# Patient Record
Sex: FEMALE | Race: WHITE
[De-identification: names, ages, dates, MRNs, and addresses within clinical notes are randomized per-mention and may not be internally consistent; named-entity substitution may affect disease eponyms.]

---

## 2019-06-18 ENCOUNTER — HOSPITAL ENCOUNTER (EMERGENCY)
Dept: HOSPITAL 89 - ER | Age: 44
Discharge: PSYCHIATRIC OR SUBSTANCE USE FACILITY | End: 2019-06-18
Payer: MEDICARE

## 2019-06-18 ENCOUNTER — HOSPITAL ENCOUNTER (INPATIENT)
Dept: HOSPITAL 89 - BHS | Age: 44
LOS: 2 days | Discharge: HOME | DRG: 882 | End: 2019-06-20
Attending: PSYCHIATRY & NEUROLOGY | Admitting: PSYCHIATRY & NEUROLOGY
Payer: MEDICARE

## 2019-06-18 VITALS — SYSTOLIC BLOOD PRESSURE: 155 MMHG | DIASTOLIC BLOOD PRESSURE: 70 MMHG

## 2019-06-18 VITALS — BODY MASS INDEX: 36.37 KG/M2 | HEIGHT: 68 IN | WEIGHT: 240 LBS

## 2019-06-18 DIAGNOSIS — Z79.899: ICD-10-CM

## 2019-06-18 DIAGNOSIS — F41.9: ICD-10-CM

## 2019-06-18 DIAGNOSIS — R45.851: Primary | ICD-10-CM

## 2019-06-18 DIAGNOSIS — F31.81: ICD-10-CM

## 2019-06-18 DIAGNOSIS — G40.909: ICD-10-CM

## 2019-06-18 DIAGNOSIS — F43.23: Primary | ICD-10-CM

## 2019-06-18 DIAGNOSIS — M19.90: ICD-10-CM

## 2019-06-18 DIAGNOSIS — F60.3: ICD-10-CM

## 2019-06-18 DIAGNOSIS — F43.10: ICD-10-CM

## 2019-06-18 DIAGNOSIS — M79.7: ICD-10-CM

## 2019-06-18 LAB — PLATELET COUNT, AUTOMATED: 207 K/UL (ref 150–450)

## 2019-06-18 PROCEDURE — 84520 ASSAY OF UREA NITROGEN: CPT

## 2019-06-18 PROCEDURE — 80320 DRUG SCREEN QUANTALCOHOLS: CPT

## 2019-06-18 PROCEDURE — 82565 ASSAY OF CREATININE: CPT

## 2019-06-18 PROCEDURE — 82435 ASSAY OF BLOOD CHLORIDE: CPT

## 2019-06-18 PROCEDURE — 84132 ASSAY OF SERUM POTASSIUM: CPT

## 2019-06-18 PROCEDURE — 36415 COLL VENOUS BLD VENIPUNCTURE: CPT

## 2019-06-18 PROCEDURE — 84450 TRANSFERASE (AST) (SGOT): CPT

## 2019-06-18 PROCEDURE — 99284 EMERGENCY DEPT VISIT MOD MDM: CPT

## 2019-06-18 PROCEDURE — 80305 DRUG TEST PRSMV DIR OPT OBS: CPT

## 2019-06-18 PROCEDURE — 80329 ANALGESICS NON-OPIOID 1 OR 2: CPT

## 2019-06-18 PROCEDURE — 81025 URINE PREGNANCY TEST: CPT

## 2019-06-18 PROCEDURE — 84295 ASSAY OF SERUM SODIUM: CPT

## 2019-06-18 PROCEDURE — 82040 ASSAY OF SERUM ALBUMIN: CPT

## 2019-06-18 PROCEDURE — 80178 ASSAY OF LITHIUM: CPT

## 2019-06-18 PROCEDURE — 84443 ASSAY THYROID STIM HORMONE: CPT

## 2019-06-18 PROCEDURE — 84155 ASSAY OF PROTEIN SERUM: CPT

## 2019-06-18 PROCEDURE — 84075 ASSAY ALKALINE PHOSPHATASE: CPT

## 2019-06-18 PROCEDURE — 84460 ALANINE AMINO (ALT) (SGPT): CPT

## 2019-06-18 PROCEDURE — 82947 ASSAY GLUCOSE BLOOD QUANT: CPT

## 2019-06-18 PROCEDURE — 82374 ASSAY BLOOD CARBON DIOXIDE: CPT

## 2019-06-18 PROCEDURE — 83735 ASSAY OF MAGNESIUM: CPT

## 2019-06-18 PROCEDURE — 82247 BILIRUBIN TOTAL: CPT

## 2019-06-18 PROCEDURE — 82310 ASSAY OF CALCIUM: CPT

## 2019-06-18 PROCEDURE — 85025 COMPLETE CBC W/AUTO DIFF WBC: CPT

## 2019-06-18 PROCEDURE — 81001 URINALYSIS AUTO W/SCOPE: CPT
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
## 2019-06-19 VITALS — SYSTOLIC BLOOD PRESSURE: 137 MMHG | DIASTOLIC BLOOD PRESSURE: 87 MMHG

## 2019-06-19 RX ADMIN — GABAPENTIN SCH MG: 300 CAPSULE ORAL at 14:01

## 2019-06-19 RX ADMIN — ACETAMINOPHEN PRN MG: 325 TABLET ORAL at 21:36

## 2019-06-19 RX ADMIN — TOPIRAMATE SCH MG: 25 TABLET, FILM COATED ORAL at 12:10

## 2019-06-19 RX ADMIN — LITHIUM CARBONATE SCH MG: 300 CAPSULE, GELATIN COATED ORAL at 21:36

## 2019-06-19 RX ADMIN — LITHIUM CARBONATE SCH MG: 300 CAPSULE, GELATIN COATED ORAL at 14:01

## 2019-06-19 RX ADMIN — SERTRALINE HYDROCHLORIDE SCH MG: 50 TABLET, FILM COATED ORAL at 12:09

## 2019-06-19 RX ADMIN — GABAPENTIN SCH MG: 300 CAPSULE ORAL at 21:36
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
## 2019-06-20 VITALS — DIASTOLIC BLOOD PRESSURE: 95 MMHG | SYSTOLIC BLOOD PRESSURE: 119 MMHG

## 2019-06-20 RX ADMIN — GABAPENTIN SCH MG: 300 CAPSULE ORAL at 08:16

## 2019-06-20 RX ADMIN — SERTRALINE HYDROCHLORIDE SCH MG: 50 TABLET, FILM COATED ORAL at 08:17

## 2019-06-20 RX ADMIN — GABAPENTIN SCH MG: 300 CAPSULE ORAL at 14:17

## 2019-06-20 RX ADMIN — TOPIRAMATE SCH MG: 25 TABLET, FILM COATED ORAL at 08:17

## 2019-06-20 RX ADMIN — ACETAMINOPHEN PRN MG: 325 TABLET ORAL at 12:10

## 2019-06-20 RX ADMIN — LITHIUM CARBONATE SCH MG: 300 CAPSULE, GELATIN COATED ORAL at 14:17

## 2019-06-20 RX ADMIN — LITHIUM CARBONATE SCH MG: 300 CAPSULE, GELATIN COATED ORAL at 08:16

## 2019-06-21 NOTE — [facility] DISCHARGE
DATE OF ADMISSION:  June 18, 2019

DATE OF DISCHARGE:  June 20, 2019



ATTENDING PHYSICIAN

Tulio Pinedo MD



Patient was seen approximately 1000 hours on the a.m. of 20 June 2019 for note 

concerning this dictation.



FINAL DIAGNOSES

1.  Borderline personality disorder.  

2.  Bipolar II disorder, recent depressed.  

3.  History of posttraumatic stress disorder.

4.  Limited stress-coping mechanism.

5.  Supportive relationship overall with her mother.  



REASON FOR ADMISSION

This is a 43-year-old  female who moved to town with her mother in the 

last few weeks here to [place], [place], to set up permanent residence.  Please 

see H and P for full details.  Patient being overwhelmed with the move in 

general, financial stressors, and getting things in order as far medication 

management in [facility].  Patient is noted prior to admission to have been setting up

treatment at [facility], but was unable to get in to 

see a provider.  Brief suicidal ideation ensued, patient not having any 

intention.  She has had a long history of hospitalizations associated with what 

is presumably mostly due to borderline personality disorder.  Patient will be in

need of therapists as well.  Medications were largely continued on the unit with

the exception of Zyprexa, which was lowered somewhat at the patient's request.  

Topamax was restarted at a low dose at 50 mg q.a.m.  This could offset some of 

the weight gain from Zyprexa and also lead to a more effective mood stability 

agent in the long term for this patient with borderline personality disorder.  

Patient overall making no parasuicidal gestures or behaviors on the unit, 

interacting well throughout her stay.  Patient having some brief suicidal 

thoughts, even at time of discharge.  However, patient had contracted for safety

and will return to the ER or call crisis line and will set up effective 

outpatient management to limit hospital frequencies of admission.  



PHYSICAL EXAMINATION

Please see emergency room note.  Notable for heavyset 43-year-old female.  

Appears stated age.  Vital signs at the time of admission:  Temperature 99.1, 

pulse 73, respiratory rate 18, blood pressure 149/76, pulse oximetry 95% on room

air.  At the time of discharge from [facility], vital signs showed 

temperature 98.6, pulse 76, respiratory rate 15, blood pressure 119/95, pulse 

oximetry 95% on room air.  



LABORATORY DATA

Upon admission, CBC was unremarkable.  CMP unremarkable as well.  TSH did show 

mild elevation of 5.38.  This could be related to lithium use; however, will 

require further investigation outpatient status.  Urinalysis unremarkable.  

Pregnancy screen negative.  Toxicology screen negative.  Notably, lithium level 

at 0.9.  



MENTAL STATUS EXAMINATION AT TIME OF DISCHARGE

GENERAL APPEARANCE, BEHAVIOR, AND ATTITUDE:  This is a polite, cooperative, 

43-year-old female.  Some psychomotor retardation present.  Patient making good 

eye contact.  No periods of tearfulness.

SPEECH:  Largely within normal limits.  Regular rate, rhythm, volume, and tone.

MOOD:  Described as improved. 

AFFECT:  Full briefly at times and mood congruent.  

THOUGHT PROCESSES:  Goal directed, logical, patient deciding she should exit the

hospital.  No loose associations or flight of ideas.  

THOUGHT CONTENT:  Free of auditory or visual hallucinations, ideas of reference,

thought broadcasting, delusions, obsessions, compulsions, and patient denying 

any suicidal intent and adamantly denying homicidal ideation.

SENSORIUM:  Clear.

COGNITION:  Alert and oriented to person, place, time, and situation.  

MEMORY:  Immediate, recent, and remote estimated intact.

INTELLIGENCE:  Average based on interview.  

INSIGHT AND JUDGMENT:  Limited stress coping mechanisms are present; however, 

patient has the support of her mother in the outpatient community after recent 

move to [place].



RESULTS OF TESTING

Imaging:  None.  Laboratory data:  See above. 



CONSULTATIONS

None.



TREATMENT

Patient received medications, participated in individual and group therapy.



HOSPITAL COURSE

Patient was polite and cooperative throughout her stay, indicated a desire to 

get back into DBT therapy as well as set up proper medication management and 

therapy management in general in the [facility].  Patient showing no 

parasuicidal behaviors while on the unit.  Medications were continued.  Zyprexa 

dose was lowered due to history of weight gain and lethargy.  



CONDITION OF PATIENT ON DISCHARGE

Stable.  Considered a minimal risk to herself or others, appropriate for 

outpatient care.  



DISPOSITION

Patient discharged to home in the care of her mother.  She would follow up with 

[facility] for DBT therapy as well as medication 

management.  It appears that the patient's primary diagnosis would be borderline

personality disorder, and she remains on a considerable amount of varying 

psychotropic medications.  Close observation on an outpatient basis.  It would 

be recommended that patient see if she can decrease some of these medications 

overall.  



DISCHARGE MEDICATIONS 

1.  Buspirone 15 mg three times a day.

2.  Gabapentin 100 mg three times a day.

3.  Lithium carbonate 300 mg three times a day.

4.  Zyprexa 10 mg at bedtime. 

5.  Xarelto 20 mg daily. 

6.  Zoloft 200 mg q.a.m. 

7.  Topamax 50 mg q.a.m.

8.  Trazodone 200 mg at bedtime.  



PLAN

Crisis line was given should symptoms return.  Risks, benefits, and alternatives

of the above discharge plan were discussed.  Informed consent was given to 

proceed with the above discharge plan by this competent patient and patient's 

mother present at time of discharge.

KAYLENE

## 2019-07-10 ENCOUNTER — HOSPITAL ENCOUNTER (OUTPATIENT)
Dept: HOSPITAL 89 - RAD | Age: 44
End: 2019-07-10
Attending: NURSE PRACTITIONER
Payer: MEDICARE

## 2019-07-10 DIAGNOSIS — D50.9: ICD-10-CM

## 2019-07-10 DIAGNOSIS — E55.9: ICD-10-CM

## 2019-07-10 DIAGNOSIS — R53.81: ICD-10-CM

## 2019-07-10 DIAGNOSIS — E87.8: ICD-10-CM

## 2019-07-10 DIAGNOSIS — S39.92XS: Primary | ICD-10-CM

## 2019-07-10 DIAGNOSIS — R53.83: ICD-10-CM

## 2019-07-10 DIAGNOSIS — E53.8: ICD-10-CM

## 2019-07-10 DIAGNOSIS — E78.00: ICD-10-CM

## 2019-07-10 DIAGNOSIS — R73.01: ICD-10-CM

## 2019-07-10 DIAGNOSIS — K52.9: ICD-10-CM

## 2019-07-10 LAB
LDLC SERPL-MCNC: 150 MG/DL
PLATELET COUNT, AUTOMATED: 259 K/UL (ref 150–450)

## 2019-07-10 PROCEDURE — 36415 COLL VENOUS BLD VENIPUNCTURE: CPT

## 2019-07-10 PROCEDURE — 84460 ALANINE AMINO (ALT) (SGPT): CPT

## 2019-07-10 PROCEDURE — 84478 ASSAY OF TRIGLYCERIDES: CPT

## 2019-07-10 PROCEDURE — 82947 ASSAY GLUCOSE BLOOD QUANT: CPT

## 2019-07-10 PROCEDURE — 83718 ASSAY OF LIPOPROTEIN: CPT

## 2019-07-10 PROCEDURE — 82306 VITAMIN D 25 HYDROXY: CPT

## 2019-07-10 PROCEDURE — 85025 COMPLETE CBC W/AUTO DIFF WBC: CPT

## 2019-07-10 PROCEDURE — 82435 ASSAY OF BLOOD CHLORIDE: CPT

## 2019-07-10 PROCEDURE — 82310 ASSAY OF CALCIUM: CPT

## 2019-07-10 PROCEDURE — 83036 HEMOGLOBIN GLYCOSYLATED A1C: CPT

## 2019-07-10 PROCEDURE — 84450 TRANSFERASE (AST) (SGOT): CPT

## 2019-07-10 PROCEDURE — 84132 ASSAY OF SERUM POTASSIUM: CPT

## 2019-07-10 PROCEDURE — 82247 BILIRUBIN TOTAL: CPT

## 2019-07-10 PROCEDURE — 84295 ASSAY OF SERUM SODIUM: CPT

## 2019-07-10 PROCEDURE — 84075 ASSAY ALKALINE PHOSPHATASE: CPT

## 2019-07-10 PROCEDURE — 82465 ASSAY BLD/SERUM CHOLESTEROL: CPT

## 2019-07-10 PROCEDURE — 82565 ASSAY OF CREATININE: CPT

## 2019-07-10 PROCEDURE — 84155 ASSAY OF PROTEIN SERUM: CPT

## 2019-07-10 PROCEDURE — 82728 ASSAY OF FERRITIN: CPT

## 2019-07-10 PROCEDURE — 84443 ASSAY THYROID STIM HORMONE: CPT

## 2019-07-10 PROCEDURE — 84520 ASSAY OF UREA NITROGEN: CPT

## 2019-07-10 PROCEDURE — 82607 VITAMIN B-12: CPT

## 2019-07-10 PROCEDURE — 72220 X-RAY EXAM SACRUM TAILBONE: CPT

## 2019-07-10 PROCEDURE — 82040 ASSAY OF SERUM ALBUMIN: CPT

## 2019-07-10 PROCEDURE — 82374 ASSAY BLOOD CARBON DIOXIDE: CPT

## 2019-07-10 NOTE — RADIOLOGY IMAGING REPORT
FACILITY: [facility] 

 

PATIENT NAME: Tana Neal

: 1975

MR: [id number]

V: [id number]

EXAM DATE: 

ORDERING PHYSICIAN: SHIELA LAMAS

TECHNOLOGIST: 

 

Location: [facility]

Patient: Tana Neal

: 1975

MRN: [id number]

Visit/Account:[id number]

Date of Sevice:  7/10/2019

 

ACCESSION #: [id number]

 

Exam: SACRUM & COCCYX

 

Indication: Fall in 2018 with progressive pain,

 

Comparison: None available

 

Findings: No fracture, dislocation or acute osseous abnormality of the sacrum and coccyx is identifie
d.  No areas of bony sclerosis or abnormal angulation are seen.  Sacroiliac joints are within normal 
limits.

 

 

IMPRESSION:

 

1.  Negative exam of the sacrum and coccyx

 

Report Dictated By: Timmy Jimenez at 7/10/2019 2:37 PM

 

Report E-Signed By: Timmy Jimenez  at 7/10/2019 2:38 PM

 

WSN:[id number]

## 2019-07-25 ENCOUNTER — HOSPITAL ENCOUNTER (EMERGENCY)
Dept: HOSPITAL 89 - ER | Age: 44
Discharge: HOME | End: 2019-07-25
Payer: MEDICARE

## 2019-07-25 VITALS — DIASTOLIC BLOOD PRESSURE: 69 MMHG | SYSTOLIC BLOOD PRESSURE: 119 MMHG

## 2019-07-25 DIAGNOSIS — R10.30: Primary | ICD-10-CM

## 2019-07-25 DIAGNOSIS — S90.121A: ICD-10-CM

## 2019-07-25 PROCEDURE — 99283 EMERGENCY DEPT VISIT LOW MDM: CPT

## 2019-07-25 PROCEDURE — 81001 URINALYSIS AUTO W/SCOPE: CPT

## 2019-07-25 NOTE — ER REPORT
History and Physical


Time Seen By MD:  22:43


HPI/ROS


CHIEF COMPLAINT: Bladder pain, right 4th toe injury





HISTORY OF PRESENT ILLNESS: 43-year-old female staying in the hospital with her 


mother who is at inpatient.  She was sleeping in a chair that reclines when she 


went to get up.  She jammed her right 4th toe and a cracked invented in an 


obscure direction causing it to be purple bruise.  She is on Xarelto.  Patient 


also notes bladder pain.  She describes a foul smell to her orders.  She denies 


burning with urination, or hematuria.  Patient notes a low-grade fever a couple 


days ago.  Patient is status post partial hysterectomy.





REVIEW OF SYSTEMS:


Respiratory: No cough, no dyspnea.


Cardiovascular: No chest pain, no palpitations.


Gastrointestinal: As above


Musculoskeletal: No back pain.


Allergies:  


Coded Allergies:  


     Tetracyclines (Verified  Allergy, Intermediate, "RASH", 7/25/19)


     gatifloxacin (Verified  Allergy, Intermediate, SEIZURE, 7/25/19)


     hydromorphone (Verified  Adverse Reaction, Intermediate, "CHEST PAINS", 


7/25/19)


     lamotrigine (Verified  Adverse Reaction, Intermediate, "SPOTS ON LEGS", 


7/25/19)


Home Meds


Reported Medications


Lurasidone (LATUDA) 80 Mg Tab, 80 MG PO QDAY, TAB


   7/25/19


Sertraline Hcl (SERTRALINE HCL) 100 Mg Tablet, 200 TAB PO QDAY, TAB


   6/20/19


Topiramate (TOPIRAMATE) 50 Mg Tablet, 50 MG PO QAM


   6/20/19


Multivits,Th W-Fe,Other Min (THERA-M) 1 Each Tablet, 1 EACH PO QDAY


   6/20/19


Lithium Carbonate (LITHIUM CARBONATE) 300 Mg Tablet, 300 MG PO TID


   6/18/19


Trazodone Hcl (TRAZODONE HCL) 100 Mg Tablet, 200 MG PO QHS PRN for INSOMNIA, TAB


   TAKE 200 MG ABOUT AN HOUR BEFORE YOU PLAN TO GO TO SLEEP AS NEEDED


   FOR INSOMNIA.


   6/18/19


Rivaroxaban 20 Mg (XARELTO 20 MG) 20 Mg Tablet, 20 MG PO QDAY, TAB


   6/18/19


Discontinued Reported Medications


Olanzapine (OLANZAPINE) 20 Mg Tablet, 10 MG PO QHS


   6/20/19


Gabapentin (GABAPENTIN) 300 Mg Capsule, 100 MG PO TID, CAPSULE


   6/18/19


Buspirone Hcl (BUSPIRONE HCL) 15 Mg Tablet, 15 MG PO TID, #10 TAB


   6/18/19


Hx Smoking:  Yes


Smoking Status:  Former Smoker


Exposure to Second Hand Smoke?:  No


Hx Substance Use Disorder:  No


Hx Alcohol Use:  Yes


Constitutional





Vital Sign - Last 24 Hours








 7/25/19 7/25/19 7/25/19 7/25/19





 22:47 22:59 23:00 23:14


 


Temp 97.6   


 


Pulse 62 61  61


 


Resp 14   


 


B/P (MAP) 141/67  128/65 (86) 


 


Pulse Ox 96 94  94


 


O2 Delivery Room Air   


 


    





 7/25/19 7/25/19  





 23:15 23:29  


 


Pulse  61  


 


B/P (MAP) 119/69 (86)   


 


Pulse Ox  94  








Physical Exam


  General Appearance: The patient is alert, has no immediate need for airway 


protection and no current signs of toxicity.  Vital signs stable, afebrile, 


pulse ox normal


HEENT: Pupils equal and round no injection.  Oropharynx without redness or 


exudate


Respiratory: Chest is non tender, lungs are clear to auscultation.


Cardiac: regular rate and rhythm


Gastrointestinal: Abdomen is soft and non tender, no masses, bowel sounds 


normal.  Mild suprapubic tenderness


Musculoskeletal:  Neck: Neck is supple and non tender.


Extremities have full range of motion and are non tender.


Skin: No rashes or lesions.





DIFFERENTIAL DIAGNOSIS: After history and physical exam differential diagnosis 


was considered for abdominal pain including but not limited to appendicitis, 


cholecystitis, gastritis and urinary tract infection.  Toe injury differential 


Sprain, strain, fracture, dislocation, contusion





Medical Decision Making


Data Points


Laboratory





Urinalysis








Test


 7/25/19


22:50


 


Urine Color Yellow 


 


Urine Clarity Clear 


 


Urine pH


 5.0 pH


(4.8-9.5)


 


Urine Specific Gravity 1.014 


 


Urine Protein


 Negative mg/dL


(NEGATIVE)


 


Urine Glucose (UA)


 Negative mg/dL


(NEGATIVE)


 


Urine Ketones


 Negative mg/dL


(NEGATIVE)


 


Urine Blood


 Negative


(NEGATIVE)


 


Urine Nitrite


 Negative


(NEGATIVE)


 


Urine Bilirubin


 Negative


(NEGATIVE)


 


Urine Urobilinogen


 Negative mg/dL


(0.2-1.9)


 


Urine Leukocyte Esterase


 Negative


(NEGATIVE)


 


Urine RBC


 6 /HPF


(0-2/HPF)


 


Urine WBC


 2 /HPF


(0-5/HPF)


 


Urine Squamous Epithelial


Cells Many /LPF


(</=FEW)


 


Urine Bacteria


 Few /HPF


(NONE-FEW)


 


Urine Mucus


 Few /HPF


(NONE-FEW)











EKG/Imaging


Imaging


X-ray: Right foot, 3 views was obtained.  I viewed the images myself on the PACS


system.  My interpretation of the images is: No fracture no dislocation or 


malalignment.  The radiologist interpretation had no clinically significant 


variation from this interpretation.





ED Course/Re-evaluation


ED Course


Patient was admitted to an examination room.  H&P was done.  The differential 


diagnoses was considered.  On clinical examination.  Patient has a bruised right


4th toe.  It's neurovascularly intact.  Diagnostic x-rays are performed which 


were unremarkable for obvious fracture.  Patient advised treatment for 


contusion.  She's on Xarelto is unable to take NSAIDs.  She is advised Tylenol 


for pain.  Patient was complaining of bladder pain and foul-smelling urine.  A 


urinalysis is unremarkable.  There is a trace hematuria, likely from her being 


on Xarelto.  No evidence of infection.  No leukocyte esterase.  Patient advised 


to follow-up with primary care.  She states she has an appointment tomorrow.


Decision to Disposition Date:  Jul 25, 2019


Decision to Disposition Time:  23:19





Depart


Departure


Latest Vital Signs





Vital Signs








  Date Time  Temp Pulse Resp B/P (MAP) Pulse Ox O2 Delivery O2 Flow Rate FiO2


 


7/25/19 23:29  61   94   


 


7/25/19 23:15    119/69 (86)    


 


7/25/19 22:47 97.6  14   Room Air  








Impression:  


   Primary Impression:  


   Lower abdominal pain


   Additional Impression:  


   Contusion of toe, right


Condition:  Improved


Disposition:  HOME OR SELF-CARE


Patient Instructions:  Abdominal Pain (ED), Contusion in Adults (ED)





Additional Instructions:  


Use Tylenol for pain


Follow-up with your primary care doctor as planned tomorrow.





Problem Qualifiers








   Additional Impression:  


   Contusion of toe, right


   Encounter type:  initial encounter  Toe:  lesser toe  Damage to nail status: 


   without damage  Qualified Codes:  S90.121A - Contusion of right lesser toe(s)


   without damage to nail, initial encounter








KAN JAEGER DO              Jul 25, 2019 22:46

## 2019-07-25 NOTE — RADIOLOGY IMAGING REPORT
FACILITY: [facility] [place] 

 

PATIENT NAME: Tana Neal

: 1975

MR: [id number]

V: [id number]

EXAM DATE: 

ORDERING PHYSICIAN: KAN JAEGER

TECHNOLOGIST: 

 

Location: [facility]

Patient: Tana Neal

: 1975

MRN: [id number]

Visit/Account:[id number]

Date of Sevice:  2019

 

ACCESSION #: [id number]

 

INDICATION: Right 4th toe  bruising injury.

 

EXAM DATE:  2019 10:56 PM

 

COMPARISON: None.

 

FINDINGS:

3 views right foot. Mineralization is normal. No acute alignment abnormality or fracture.  Small os p
eroneum.  Small calcaneal spur.  Soft tissues are unremarkable.

 

IMPRESSION:  No acute osseous abnormality of the right foot.

 

Report Dictated By: Isael Washington MD at 2019 11:38 PM

 

Report E-Signed By: Isael Washington MD  at 2019 11:39 PM

 

WSN:[id number]

## 2019-07-26 ENCOUNTER — HOSPITAL ENCOUNTER (OUTPATIENT)
Dept: HOSPITAL 89 - CT | Age: 44
End: 2019-07-26
Attending: NURSE PRACTITIONER
Payer: MEDICARE

## 2019-07-26 DIAGNOSIS — R10.814: Primary | ICD-10-CM

## 2019-07-26 PROCEDURE — 74177 CT ABD & PELVIS W/CONTRAST: CPT

## 2019-07-26 NOTE — RADIOLOGY IMAGING REPORT
FACILITY: [facility] [place] 

 

PATIENT NAME: Tana Neal

: 1975

MR: [id number]

V: [id number]

EXAM DATE: 

ORDERING PHYSICIAN: SHIELA LAMAS

TECHNOLOGIST: 

 

Location: [facility]

Patient: Tana Neal

: 1975

MRN: [id number]

Visit/Account:[id number]

Date of Sevice:  2019

 

ACCESSION #: [id number]

 

EXAMINATION: CT abdomen and pelvis with contrast

 

COMPARISON: None.

 

HISTORY: Right lower quadrant pain and diarrhea for one year.

 

PROCEDURE: Multiplanar contrast enhanced CT of the abdomen and pelvis with 75 mL intravenous Isovue 3
70. One of the following dose optimization techniques was utilized in the performance of this exam: A
utomated exposure control; adjustment of the mA and/or kV according to the patient's size; or use of 
an iterative  reconstruction technique.  Specific details can be referenced in the facility's radiolo
gy CT exam operational policy.

 

FINDINGS:

Visualized thorax: No acute findings.

 

Liver: Negative.

 

Gallbladder and biliary system: Negative

 

Spleen: Negative.

 

Pancreas: Negative.

 

Adrenal glands: Negative.

 

Kidneys and bladder: Negative.

 

Vessels: Negative.

 

Bowel and mesentery: Stomach, small bowel, and appendix are unremarkable. Small amount of stool in co
sherin. No definite colonic diverticula are identified. No inflammation.

 

Pelvic organs: Hysterectomy. No adnexal mass.

 

Lymph nodes: No adenopathy.

 

Free air/free fluid: None.

 

Musculoskeletal: Negative.

 

IMPRESSION:

No findings of active disease in the abdomen or pelvis.

 

Report Dictated By: Dedrick Kim MD at 2019 6:48 PM

 

Report E-Signed By: Dedrick Kim MD  at 2019 6:56 PM

 

WSN:[id number]

## 2019-08-16 ENCOUNTER — HOSPITAL ENCOUNTER (INPATIENT)
Dept: HOSPITAL 89 - BHS | Age: 44
LOS: 3 days | Discharge: HOME | DRG: 885 | End: 2019-08-19
Attending: PSYCHIATRY & NEUROLOGY | Admitting: PSYCHIATRY & NEUROLOGY
Payer: MEDICARE

## 2019-08-16 ENCOUNTER — HOSPITAL ENCOUNTER (EMERGENCY)
Dept: HOSPITAL 89 - ER | Age: 44
End: 2019-08-16
Payer: MEDICARE

## 2019-08-16 VITALS — DIASTOLIC BLOOD PRESSURE: 80 MMHG | SYSTOLIC BLOOD PRESSURE: 129 MMHG

## 2019-08-16 VITALS — DIASTOLIC BLOOD PRESSURE: 82 MMHG | SYSTOLIC BLOOD PRESSURE: 134 MMHG

## 2019-08-16 VITALS — BODY MASS INDEX: 39.84 KG/M2 | HEIGHT: 69 IN | WEIGHT: 269 LBS

## 2019-08-16 DIAGNOSIS — F43.12: ICD-10-CM

## 2019-08-16 DIAGNOSIS — J45.909: ICD-10-CM

## 2019-08-16 DIAGNOSIS — Z86.718: ICD-10-CM

## 2019-08-16 DIAGNOSIS — G40.909: ICD-10-CM

## 2019-08-16 DIAGNOSIS — F60.3: ICD-10-CM

## 2019-08-16 DIAGNOSIS — M79.7: ICD-10-CM

## 2019-08-16 DIAGNOSIS — J32.9: ICD-10-CM

## 2019-08-16 DIAGNOSIS — Z88.8: ICD-10-CM

## 2019-08-16 DIAGNOSIS — Z86.711: ICD-10-CM

## 2019-08-16 DIAGNOSIS — R00.1: ICD-10-CM

## 2019-08-16 DIAGNOSIS — Z91.410: ICD-10-CM

## 2019-08-16 DIAGNOSIS — R45.851: Primary | ICD-10-CM

## 2019-08-16 DIAGNOSIS — F31.81: Primary | ICD-10-CM

## 2019-08-16 DIAGNOSIS — R45.851: ICD-10-CM

## 2019-08-16 LAB — PLATELET COUNT, AUTOMATED: 218 K/UL (ref 150–450)

## 2019-08-16 PROCEDURE — 82565 ASSAY OF CREATININE: CPT

## 2019-08-16 PROCEDURE — 83550 IRON BINDING TEST: CPT

## 2019-08-16 PROCEDURE — 82310 ASSAY OF CALCIUM: CPT

## 2019-08-16 PROCEDURE — 93005 ELECTROCARDIOGRAM TRACING: CPT

## 2019-08-16 PROCEDURE — 81001 URINALYSIS AUTO W/SCOPE: CPT

## 2019-08-16 PROCEDURE — 84443 ASSAY THYROID STIM HORMONE: CPT

## 2019-08-16 PROCEDURE — 80329 ANALGESICS NON-OPIOID 1 OR 2: CPT

## 2019-08-16 PROCEDURE — 80305 DRUG TEST PRSMV DIR OPT OBS: CPT

## 2019-08-16 PROCEDURE — 82040 ASSAY OF SERUM ALBUMIN: CPT

## 2019-08-16 PROCEDURE — 82247 BILIRUBIN TOTAL: CPT

## 2019-08-16 PROCEDURE — 85025 COMPLETE CBC W/AUTO DIFF WBC: CPT

## 2019-08-16 PROCEDURE — 80178 ASSAY OF LITHIUM: CPT

## 2019-08-16 PROCEDURE — 84075 ASSAY ALKALINE PHOSPHATASE: CPT

## 2019-08-16 PROCEDURE — 82435 ASSAY OF BLOOD CHLORIDE: CPT

## 2019-08-16 PROCEDURE — 99284 EMERGENCY DEPT VISIT MOD MDM: CPT

## 2019-08-16 PROCEDURE — 84295 ASSAY OF SERUM SODIUM: CPT

## 2019-08-16 PROCEDURE — 84520 ASSAY OF UREA NITROGEN: CPT

## 2019-08-16 PROCEDURE — 83735 ASSAY OF MAGNESIUM: CPT

## 2019-08-16 PROCEDURE — 83540 ASSAY OF IRON: CPT

## 2019-08-16 PROCEDURE — 84460 ALANINE AMINO (ALT) (SGPT): CPT

## 2019-08-16 PROCEDURE — 84450 TRANSFERASE (AST) (SGOT): CPT

## 2019-08-16 PROCEDURE — 84155 ASSAY OF PROTEIN SERUM: CPT

## 2019-08-16 PROCEDURE — 82947 ASSAY GLUCOSE BLOOD QUANT: CPT

## 2019-08-16 PROCEDURE — 81025 URINE PREGNANCY TEST: CPT

## 2019-08-16 PROCEDURE — 84132 ASSAY OF SERUM POTASSIUM: CPT

## 2019-08-16 PROCEDURE — 82374 ASSAY BLOOD CARBON DIOXIDE: CPT

## 2019-08-16 PROCEDURE — 80320 DRUG SCREEN QUANTALCOHOLS: CPT

## 2019-08-16 PROCEDURE — 36415 COLL VENOUS BLD VENIPUNCTURE: CPT

## 2019-08-16 RX ADMIN — LITHIUM CARBONATE SCH MG: 450 TABLET, EXTENDED RELEASE ORAL at 20:28

## 2019-08-16 RX ADMIN — LURASIDONE HYDROCHLORIDE SCH MG: 40 TABLET, FILM COATED ORAL at 20:27

## 2019-08-16 RX ADMIN — TOPIRAMATE SCH MG: 25 TABLET, FILM COATED ORAL at 20:27

## 2019-08-16 NOTE — EKG
FACILITY: [facility] [place] 

 

PATIENT NAME: RAJAT HERNANDEZ

: [id number]

MR: [id number]

V: [id number]

EXAM DATE: 

ORDERING PHYSICIAN: HÉCTOR HERNANDEZ

TECHNOLOGIST: JAQUELIN

 

Test Reason : BHS

Blood Pressure : ***/*** mmHG

Vent. Rate : 048 BPM     Atrial Rate : 048 BPM

   P-R Int : 168 ms          QRS Dur : 118 ms

    QT Int : 434 ms       P-R-T Axes : 046 -13 051 degrees

   QTc Int : 387 ms

 

Marked sinus bradycardia

Low voltage QRS

Abnormal ECG

No previous ECGs available

Confirmed by Kaiden Dumont ([id number]) on 2019 9:03:54 PM

 

Referred By:  DAVID           Confirmed By:Kaiden Marte

## 2019-08-16 NOTE — ER REPORT
History and Physical


Time Seen By MD:  13:55


HPI/ROS


CHIEF COMPLAINT: Depression, suicidal ideation





HISTORY OF PRESENT ILLNESS: Patient is a 43-year-old female here with complaints


of depression, suicidal ideation for the past week with family stressors. 


Patient reports that family members often make fun of her and try to isolate 


her. Patient does report being on multiple antidepressants and mood modify 


medications including recent addition of Effexor approximately one week ago, 


lithium, Topamax, latuda, Xarelto, trazodone. Patient was evaluated today by a 


therapist who recommended coming into the hospital for further evaluation and 


care. Patient does report a history of low iron, IBS.





REVIEW OF SYSTEMS:


Constitutional: No fever, no chills.


Eyes: No discharge.


ENT: No sore throat. 


Cardiovascular: No chest pain, no palpitations.


Respiratory: No cough, no shortness of breath.


Gastrointestinal: No abdominal pain, no vomiting.


Genitourinary: No hematuria.


Musculoskeletal: No back pain.


Skin: No rashes.


Neurological: No headache.


Psych: + Depression, suicidal ideations


Allergies:  


Coded Allergies:  


     Tetracyclines (Verified  Allergy, Intermediate, "RASH", 8/16/19)


     gatifloxacin (Verified  Allergy, Intermediate, SEIZURE, 8/16/19)


     hydromorphone (Verified  Adverse Reaction, Intermediate, "CHEST PAINS", 


8/16/19)


     lamotrigine (Verified  Adverse Reaction, Intermediate, "SPOTS ON LEGS", 


8/16/19)


Home Meds


Reported Medications


Topiramate (TOPAMAX) 200 Mg Tablet, 200 MG PO BID


   8/16/19


Lithium Carbonate (LITHIUM CARBONATE) 450 Mg Tabcr, 450 MG PO BID


   8/16/19


Venlafaxine Hcl (EFFEXOR XR) 75 Mg Cap.er.24h, 75 MG PO QDAY


   8/16/19


Lurasidone (LATUDA) 80 Mg Tab, 80 MG PO QDAY, TAB


   7/25/19


Multivits,Th W-Fe,Other Min (THERA-M) 1 Each Tablet, 1 EACH PO QDAY


   6/20/19


Trazodone Hcl (TRAZODONE HCL) 100 Mg Tablet, 200 MG PO QHS PRN for INSOMNIA, TAB


   TAKE 200 MG ABOUT AN HOUR BEFORE YOU PLAN TO GO TO SLEEP AS NEEDED


   FOR INSOMNIA.


   6/18/19


Rivaroxaban 20 Mg (XARELTO 20 MG) 20 Mg Tablet, 20 MG PO QDAY, TAB


   6/18/19


Discontinued Reported Medications


Sertraline Hcl (SERTRALINE HCL) 100 Mg Tablet, 200 TAB PO QDAY, TAB


   6/20/19


Topiramate (TOPIRAMATE) 50 Mg Tablet, 50 MG PO QAM


   6/20/19


Lithium Carbonate (LITHIUM CARBONATE) 300 Mg Tablet, 300 MG PO TID


   6/18/19


Hx Smoking:  Yes


Smoking Status:  Former Smoker


Exposure to Second Hand Smoke?:  No


Hx Substance Use Disorder:  No


Hx Alcohol Use:  Yes


Constitutional





Vital Sign - Last 24 Hours








 8/16/19 8/16/19





 14:05 15:24


 


Temp 98.4 


 


Pulse 50 74


 


Resp 18 18


 


B/P (MAP) 149/75 129/80 (96)


 


Pulse Ox 95 99


 


O2 Delivery Room Air Room Air








Physical Exam


   General Appearance: The patient is alert, has no immediate need for airway 


protection and no signs of toxicity. No acute distress, depressed affect


Eyes: Pupils equal and round no pallor or injection.


ENT, Mouth: Mucous membranes are moist.


Respiratory: There are no retractions, lungs are clear to auscultation.


Cardiovascular: Regular rate and rhythm. 


Gastrointestinal:  Abdomen is soft and non tender, no masses, bowel sounds 


normal.


Neurological: No focal neurological deficits, cranial nerves intact


Skin: Warm and dry, no rashes.


Musculoskeletal:  Neck is supple non tender.


      Extremities are nontender, nonswollen and have full range of motion.


Psych: Depressed affect, admits to suicidal ideation, denies homicidal ideation








DIFFERENTIAL DIAGNOSIS: After history and physical exam differential diagnosis 


was considered for depression, new medication side effect, electrolyte 


abnormality, suicidal ideations, borderline





Medical Decision Making


Data Points


Result Diagram:  


8/16/19 1432                                                                    


           8/16/19 1432





Laboratory





Hematology








Test


 8/16/19


14:32


 


White Blood Count


 9.9 k/uL


(4.5-11.0)


 


Red Blood Count


 5.05 M/uL


(4.17-5.56)


 


Hemoglobin


 15.5 g/dL


(12.0-16.0)


 


Hematocrit


 45.2 %


(34.0-47.0)


 


Mean Corpuscular Volume


 89.4 fL


(80.0-96.0)


 


Mean Corpuscular Hemoglobin


 30.7 pg


(26.0-33.0)


 


Mean Corpuscular Hemoglobin


Concent 34.3 g/dL


(32.0-36.0)


 


Red Cell Distribution Width


 14.2 %


(11.5-14.5)


 


Platelet Count


 218 K/uL


(150-450)


 


Mean Platelet Volume


 8.9 fL


(7.2-11.1)


 


Neutrophils (%) (Auto)


 59.6 %


(39.4-72.5)


 


Lymphocytes (%) (Auto)


 31.8 %


(17.6-49.6)


 


Monocytes (%) (Auto)


 4.3 %


(4.1-12.4)


 


Eosinophils (%) (Auto)


 3.5 %


(0.4-6.7)


 


Basophils (%) (Auto)


 0.8 %


(0.3-1.4)


 


Nucleated RBC Relative Count


(auto) 0.2 /100WBC  





 


Neutrophils # (Auto)


 5.9 K/uL


(2.0-7.4)


 


Lymphocytes # (Auto)


 3.2 K/uL


(1.3-3.6)


 


Monocytes # (Auto)


 0.4 K/uL


(0.3-1.0)


 


Eosinophils # (Auto)


 0.3 K/uL


(0.0-0.5)


 


Basophils # (Auto)


 0.1 K/uL


(0.0-0.1)


 


Nucleated RBC Absolute Count


(auto) 0.02 K/uL  











Chemistry








Test


 8/16/19


14:32


 


Sodium Level


 137 mmol/L


(137-145)


 


Potassium Level


 3.4 mmol/L


(3.5-5.0)


 


Chloride Level


 112 mmol/L


()


 


Carbon Dioxide Level


 14 mmol/L


(22-31)


 


Blood Urea Nitrogen 6 mg/dl (7-18) 


 


Creatinine


 0.90 mg/dl


(0.52-1.04)


 


Glomerular Filtration Rate


Calc > 60.0 





 


Random Glucose


 96 mg/dl


()


 


Calcium Level


 9.0 mg/dl


(8.4-10.2)


 


Magnesium Level


 2.0 mg/dl


(1.7-2.2)


 


Iron Level


 107 ug/dl


()


 


Total Iron Binding Capacity


 314 ug/dl


(261-497)


 


Percent Iron Saturation 34.1 % 


 


Total Bilirubin


 0.4 mg/dl


(0.2-1.3)


 


Aspartate Amino Transf


(AST/SGOT) 21 U/L (0-35) 





 


Alanine Aminotransferase


(ALT/SGPT) 58 U/L (0-56) 





 


Alkaline Phosphatase 84 U/L (0-126) 


 


Total Protein


 7.0 g/dl


(6.3-8.2)


 


Albumin


 4.0 g/dl


(3.5-5.0)








Toxicology








Test


 8/16/19


13:58 8/16/19


14:32


 


Urine Opiates Screen Negative  


 


Urine Barbiturates Screen Negative  


 


Ur Tricyclic Antidepressants


Screen Negative 


 





 


Urine Phencyclidine Screen Negative  


 


Urine Amphetamines Screen Negative  


 


Urine Benzodiazepines Screen Negative  


 


Urine Cocaine Screen Negative  


 


Urine Cannabinoids Screen Negative  


 


Salicylates Level  73 mg/L 


 


Salicylate Last Dose Date  unknown 


 


Acetaminophen Level  < 10 ug/ml 


 


Lithium Level


 


 1.4 mmol/L


(0.6-1.2)


 


Serum Alcohol  < 10 mg/dl 








Urinalysis








Test


 8/16/19


13:58


 


Urine Color Yellow 


 


Urine Clarity


 Slightly-cloudy





 


Urine pH


 5.0 pH


(4.8-9.5)


 


Urine Specific Gravity 1.027 


 


Urine Protein


 Negative mg/dL


(NEGATIVE)


 


Urine Glucose (UA)


 Negative mg/dL


(NEGATIVE)


 


Urine Ketones


 Negative mg/dL


(NEGATIVE)


 


Urine Blood


 Negative


(NEGATIVE)


 


Urine Nitrite


 Negative


(NEGATIVE)


 


Urine Bilirubin


 Negative


(NEGATIVE)


 


Urine Urobilinogen


 Negative mg/dL


(0.2-1.9)


 


Urine Leukocyte Esterase


 Negative


(NEGATIVE)


 


Urine RBC


 2 /HPF


(0-2/HPF)


 


Urine WBC


 4 /HPF


(0-5/HPF)


 


Urine Squamous Epithelial


Cells Many /LPF


(</=FEW)


 


Urine Bacteria


 Few /HPF


(NONE-FEW)


 


Urine Hyaline Casts


 Many /LPF


(NONE-FEW)


 


Urine Mucus


 Few /HPF


(NONE-FEW)


 


Urine HCG, Qualitative


 Negative


(NEGATIVE)











ED Course/Re-evaluation


ED Course


Patient is a 43-year-old female with a history of depression and prior suicide 


attempts 9, recent life stressors, recently started on Effexor one week ago. 


Patient reports that she had attempted to harm herself previously 9 times by 


taking her medications and overdoses. Denies suicidal attempt today. Patient 


labs were unremarkable. I discussed the patient with Dr. Ramirez with behavioral 


health services who accepted the patient. Patient was admitted voluntarily. 


Patient was hemodynamically stable at time of admission.


Decision to Disposition Date:  Aug 16, 2019


Decision to Disposition Time:  16:00





Depart


Departure


Latest Vital Signs





Vital Signs








  Date Time  Temp Pulse Resp B/P (MAP) Pulse Ox O2 Delivery O2 Flow Rate FiO2


 


8/16/19 15:24  74 18 129/80 (96) 99 Room Air  


 


8/16/19 14:05 98.4       








Impression:  


   Primary Impression:  


   Suicidal ideation


Condition:  Improved


[facility]  [facility]











HÉCTOR HERNANDEZ DO           Aug 16, 2019 14:00

## 2019-08-17 VITALS — DIASTOLIC BLOOD PRESSURE: 46 MMHG | SYSTOLIC BLOOD PRESSURE: 104 MMHG

## 2019-08-17 VITALS — SYSTOLIC BLOOD PRESSURE: 108 MMHG | DIASTOLIC BLOOD PRESSURE: 58 MMHG

## 2019-08-17 VITALS — DIASTOLIC BLOOD PRESSURE: 60 MMHG | SYSTOLIC BLOOD PRESSURE: 113 MMHG

## 2019-08-17 RX ADMIN — TOPIRAMATE SCH MG: 25 TABLET, FILM COATED ORAL at 20:38

## 2019-08-17 RX ADMIN — LITHIUM CARBONATE SCH MG: 450 TABLET, EXTENDED RELEASE ORAL at 09:01

## 2019-08-17 RX ADMIN — ACETAMINOPHEN PRN MG: 325 TABLET ORAL at 09:01

## 2019-08-17 RX ADMIN — GABAPENTIN SCH MG: 300 CAPSULE ORAL at 12:15

## 2019-08-17 RX ADMIN — GABAPENTIN SCH MG: 300 CAPSULE ORAL at 20:38

## 2019-08-17 RX ADMIN — RIVAROXABAN SCH MG: 10 TABLET, FILM COATED ORAL at 09:01

## 2019-08-17 RX ADMIN — LURASIDONE HYDROCHLORIDE SCH MG: 40 TABLET, FILM COATED ORAL at 20:38

## 2019-08-17 RX ADMIN — TOPIRAMATE SCH MG: 25 TABLET, FILM COATED ORAL at 09:01

## 2019-08-17 RX ADMIN — LITHIUM CARBONATE SCH MG: 450 TABLET, EXTENDED RELEASE ORAL at 20:38

## 2019-08-17 RX ADMIN — SERTRALINE HYDROCHLORIDE SCH MG: 50 TABLET, FILM COATED ORAL at 11:01
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
## 2019-08-18 VITALS — SYSTOLIC BLOOD PRESSURE: 122 MMHG | DIASTOLIC BLOOD PRESSURE: 84 MMHG

## 2019-08-18 RX ADMIN — ELUXADOLINE SCH MG: 100 TABLET, FILM COATED ORAL at 21:42

## 2019-08-18 RX ADMIN — LURASIDONE HYDROCHLORIDE SCH MG: 40 TABLET, FILM COATED ORAL at 21:41

## 2019-08-18 RX ADMIN — ACETAMINOPHEN PRN MG: 325 TABLET ORAL at 10:38

## 2019-08-18 RX ADMIN — RIVAROXABAN SCH MG: 10 TABLET, FILM COATED ORAL at 08:37

## 2019-08-18 RX ADMIN — ACETAMINOPHEN PRN MG: 325 TABLET ORAL at 05:05

## 2019-08-18 RX ADMIN — GABAPENTIN SCH MG: 300 CAPSULE ORAL at 21:41

## 2019-08-18 RX ADMIN — GABAPENTIN SCH MG: 300 CAPSULE ORAL at 08:37

## 2019-08-18 RX ADMIN — ACETAMINOPHEN PRN MG: 325 TABLET ORAL at 20:16

## 2019-08-18 RX ADMIN — SERTRALINE HYDROCHLORIDE SCH MG: 50 TABLET, FILM COATED ORAL at 08:37

## 2019-08-18 RX ADMIN — LITHIUM CARBONATE SCH MG: 450 TABLET, EXTENDED RELEASE ORAL at 21:41

## 2019-08-18 RX ADMIN — TOPIRAMATE SCH MG: 25 TABLET, FILM COATED ORAL at 08:37

## 2019-08-18 RX ADMIN — ALUMINUM HYDROXIDE, MAGNESIUM HYDROXIDE, SIMETHICONE PRN ML: 200; 200; 20 LIQUID ORAL at 08:37

## 2019-08-18 RX ADMIN — LITHIUM CARBONATE SCH MG: 450 TABLET, EXTENDED RELEASE ORAL at 08:37

## 2019-08-18 RX ADMIN — ELUXADOLINE SCH MG: 100 TABLET, FILM COATED ORAL at 08:37

## 2019-08-18 RX ADMIN — TOPIRAMATE SCH MG: 25 TABLET, FILM COATED ORAL at 21:41

## 2019-08-18 NOTE — BHS PROGRESS NOTE
BHS - Subjective


Progress Notes


Subjective


Pt seen in conference room with staff.  Pt doing a little bit better today.  


Still overall depressed mood, and still with some suicidal thoughts, but these 


"suicidal thoughts are not as alive in my head."  Rating depression at 3/10 and 


anxiety at 8/10.  Tolerating restart of zoloft well without side effects, also 


tolerated restart of gabapentin well.  Today we will increase zoloft to 100 mg, 


and go to 150 mg tomorrow.  Discussed her outpatient care concerns and will make


referrals and follow up appointments tomorrow, she would like to consolidate her


care to the [facility] where she can get psychiatric medication


management as well as medical care.  He mother will be present for treatment 


team tomorrow am-- tentative discharge tomorrow if SI resolves and pt continues 


to show forward progress.


Suicidal Ideation:  Ongoing


Homicidal Ideation:  None





BHS - Objective


Physical Exam


Vital Signs





Vital Signs








 8/18/19





 05:22


 


Temp 97.9


 


Pulse 95


 


Resp 15


 


B/P (MAP) 122/84 (97)


 


Pulse Ox 96


 


O2 Delivery Room Air








Muscle Strength and Tone:  WNL


Gait and Station:  Steady


BHS Medications Reviewed:  Side Effects, Benefits of Medication, Risks


Allergies Reviewed:  Yes





Mental Status Exam


General Appearance:  Casual, Cooperative, Polite, Good Interaction


Speech:  Clear, Spontaneous, Normal Rate, Normal Rhythm, Normal Volume, Normal 


Tone


Mood:  Dysthmic/Depressed


Affect:  Calm, Sad, Anxious


Thought Process:  Organized, Logical, Goal Directed


Thought Content:  Suicidal Ideation; No Homicidal Ideation, No Delusions, No 


Auditory Halllucinations, No Visual Hallucinations, No Thought Broadcasting, No 


Ideas of Reference, No Obsessions, No Compulsions, No Other


Sensorium:  Clear


Cognition:  Alert & Oriented-Person, Alert & Oriented-Place, Alert & Oriented-


Time, Alert-Oriented-Situation


Memory:  Immediate, Recent, Remote


Intelligence:  Average


Insight Judgment:  Fair





BHS Assessment and Plan


Face-to-Face Encounter Date:  Aug 18, 2019


Face-to-Face Encounter Time:  10:00


BHS Plan:  Admit to Unit, Necessary Precautions, Individual/Group Therapy, 


Admin/Titrate Meds, Educate Patient


Tobacco Medications:  Not Appropriate Condition


Multpiple Antipsychotics Used:  No


Problems:  


(1) Bipolar 2 disorder


(2) Cluster B personality disorder


(3) PTSD (post-traumatic stress disorder)











ARIANA GRESHAM MD                 Aug 18, 2019 13:54
Yes

## 2019-08-19 VITALS — DIASTOLIC BLOOD PRESSURE: 75 MMHG | SYSTOLIC BLOOD PRESSURE: 106 MMHG

## 2019-08-19 RX ADMIN — ALUMINUM HYDROXIDE, MAGNESIUM HYDROXIDE, SIMETHICONE PRN ML: 200; 200; 20 LIQUID ORAL at 08:22

## 2019-08-19 RX ADMIN — LITHIUM CARBONATE SCH MG: 450 TABLET, EXTENDED RELEASE ORAL at 08:15

## 2019-08-19 RX ADMIN — RIVAROXABAN SCH MG: 10 TABLET, FILM COATED ORAL at 08:16

## 2019-08-19 RX ADMIN — TOPIRAMATE SCH MG: 25 TABLET, FILM COATED ORAL at 08:15

## 2019-08-19 RX ADMIN — ELUXADOLINE SCH MG: 100 TABLET, FILM COATED ORAL at 08:16

## 2019-08-19 RX ADMIN — GABAPENTIN SCH MG: 300 CAPSULE ORAL at 08:15

## 2019-08-19 RX ADMIN — ACETAMINOPHEN PRN MG: 325 TABLET ORAL at 03:14

## 2019-08-19 RX ADMIN — ALUMINUM HYDROXIDE, MAGNESIUM HYDROXIDE, SIMETHICONE PRN ML: 200; 200; 20 LIQUID ORAL at 12:38

## 2019-08-19 NOTE — BHS DISCHARGE SUMMARY
BHS Discharge Summary


Face-to-Face Encounter Date:  Aug 19, 2019


Face-to-Face Encounter Time:  10:00


Reason-Hosp/Final Diag (DSM-V):  


(1) Bipolar 2 disorder


Hospital Course & Plan:  CHIEF COMPLAINT  


"I was having suicidal ideations."





HISTORY OF PRESENT ILLNESS 


This is the second [facility] psychiatric admission and one of approximately 30 


lifetime psychiatric admissions for this 43-year-old woman who presents with 


increasing depression and suicidal ideation and who is a voluntary patient.  The


patient says that she has a history of borderline personality disorder, bipolar 


II disorder, and PTSD.  She moved to [place] from [place] approximately five 


months ago.  Her most recent psychiatric admission was here on [facility] in June of 


this year, at that time for depression with suicidal ideation.  After that 


admission, she started seeing a new psychiatric provider, and some medication 


changes have ensued over the past two months in an effort to simplify her 


medication regimen.  In June, gabapentin, BuSpar, and Zyprexa were all discont


inued.  Latuda was started at that time.  She began to notice feeling more mood 


instability with agitation, depression, more tearful episodes, and more anxiety.


 Then two weeks ago, Zoloft 200 mg was discontinued, and Effexor was started.  


For the past two weeks, she has had the onset of suicidal ideation with thoughts


of taking an overdose of pills.  She has also noticed some flu-like symptoms 


including muscle aches, feeling feverish, and feeling very tired since the 


abrupt discontinuation of the Zoloft.  Yesterday, she saw her outpatient 


therapist and was describing her suicidal thoughts, and her therapist 


recommended that she be evaluated in the Emergency Room.  She was then admitted 


voluntarily to [facility] due to suicidal ideation.  





PAST MENTAL HEALTH HISTORY


The patient was first diagnosed with depression at the age of 16.  She has had 


approximately 30 inpatient psychiatric admissions over the years, most recently 


two months ago here on [facility].  Her first hospitalizations were in [place], and then 


most of her hospitalizations were between the ages of 34 and 40 when she was 


living in [place].  She had a couple of hospitalizations in [place], [place], 


over the past three years.  She has been treated as an outpatient, most recently


at [facility] where she sees Onel for therapy.  She has also been 


seeing Yamilka Marques for medication management.  The patient has had nine suicide 


attempts, all by pill overdose, and the last one was two years ago in August 


when she did require four days of ICU care.  She has a past history of cutting 


to relieve anxiety, but has not done so in over two years.    





HOSPITAL COURSE


Pt was admitted to [facility] and maintained on suicide precautions.  She was active in


her treatment and attended all groups and mileau activities.  We discussed her 


medications in family meeting with her mother present, and they both agreed that


with recent med changes pt was getting worse, not better.  Pt wanted to go back 


on zoloft and gabapentin.  There was an element of SSRI withdrawal as well as 


new changes being less effective.  Zoloft was restarted and titrated to 200 mg q


am, gabapentin was restarted at 300 mg BID, and effexor was DC'd.  She was also 


treated for sinusitis with augmentin for a 10 day course to be completed after 


discharge. Pt's mood improved bit by bit, with resolution of SI.  We helped her 


arrange outpatient follow up to return to [facility] for therapy with Onel.


 She requested referral to [facility] so that she could have 


her medical and psychiatric care in one location.  By day of discharge she was 


improved, with brighter affect, and resolution of SI.





(2) PTSD (post-traumatic stress disorder)


(3) Cluster B personality disorder


Physical Exam


Latest Vital Signs





Vital Signs








 8/18/19 8/19/19





 05:22 06:06


 


Temp  98.1


 


Pulse  69


 


Resp 15 


 


B/P (MAP)  106/75 (85)


 


Pulse Ox  96


 


O2 Delivery  Room Air











Mental Status Exam


General Appearance:  Casual, Well Groomed, Good Eye Contact, Cooperative, 


Polite, Good Interaction


Speech:  Clear, Spontaneous, Normal Rate, Normal Rhythm, Normal Volume, Normal 


Tone


Mood:  Euthymic


Affect:  Full and Appropriate, Calm


Thought Process:  Organized, Logical, Goal Directed


Thought Content:  No Suicidal Ideation, No Homicidal Ideation, No Delusions, No 


Auditory Halllucinations, No Visual Hallucinations, No Thought Broadcasting, No 


Ideas of Reference, No Obsessions, No Compulsions, No Other


Sensorium:  Clear


Cognition:  Alert & Oriented-Person, Alert & Oriented-Place, Alert & Oriented-


Time, Alert-Oriented-Situation


Memory:  Immediate, Recent, Remote


Intelligence:  Average


Insight Judgment:  Fair





Departure











Item Value  Date Time


 


Lithium Level 1.3 mmol/L H 8/17/19 0549


 


White Blood Count 9.9 k/uL 8/16/19 1432


 


Red Blood Count 5.05 M/uL 8/16/19 1432


 


Hemoglobin 15.5 g/dL 8/16/19 1432


 


Hematocrit 45.2 % 8/16/19 1432


 


Mean Corpuscular Volume 89.4 fL 8/16/19 1432


 


Mean Corpuscular Hemoglobin 30.7 pg 8/16/19 1432


 


Mean Corpuscular Hemoglobin Concent 34.3 g/dL 8/16/19 1432


 


Red Cell Distribution Width 14.2 % 8/16/19 1432


 


Platelet Count 218 K/uL 8/16/19 1432


 


Sodium Level 137 mmol/L 8/16/19 1432


 


Potassium Level 3.4 mmol/L L 8/16/19 1432


 


Chloride Level 112 mmol/L H 8/16/19 1432


 


Carbon Dioxide Level 14 mmol/L *L 8/16/19 1432


 


Blood Urea Nitrogen 6 mg/dl L 8/16/19 1432


 


Creatinine 0.90 mg/dl 8/16/19 1432


 


Glomerular Filtration Rate Calc > 60.0 8/16/19 1432


 


Random Glucose 96 mg/dl 8/16/19 1432


 


Calcium Level 9.0 mg/dl 8/16/19 1432


 


Magnesium Level 2.0 mg/dl 8/16/19 1432


 


Iron Level 107 ug/dl 8/16/19 1432


 


Total Iron Binding Capacity 314 ug/dl 8/16/19 1432


 


Percent Iron Saturation 34.1 % 8/16/19 1432


 


Total Bilirubin 0.4 mg/dl 8/16/19 1432


 


Aspartate Amino Transf (AST/SGOT) 21 U/L 8/16/19 1432


 


Alanine Aminotransferase (ALT/SGPT) 58 U/L H 8/16/19 1432


 


Alkaline Phosphatase 84 U/L 8/16/19 1432


 


Total Protein 7.0 g/dl 8/16/19 1432


 


Albumin 4.0 g/dl 8/16/19 1432


 


Thyroid Stimulating Hormone (TSH) 2.27 uIU/ml 8/16/19 1432


 


Urine Color Yellow 8/16/19 1358


 


Urine Clarity Slightly-cloudy 8/16/19 1358


 


Urine pH 5.0 pH 8/16/19 1358


 


Urine Specific Gravity 1.027 8/16/19 1358


 


Urine Protein Negative mg/dL 8/16/19 1358


 


Urine Glucose (UA) Negative mg/dL 8/16/19 1358


 


Urine Ketones Negative mg/dL 8/16/19 1358


 


Urine Blood Negative 8/16/19 1358


 


Urine Nitrite Negative 8/16/19 1358


 


Urine Bilirubin Negative 8/16/19 1358


 


Urine Urobilinogen Negative mg/dL 8/16/19 1358


 


Urine Leukocyte Esterase Negative 8/16/19 1358


 


Salicylates Level 73 mg/L 8/16/19 1432


 


Salicylate Last Dose Date unknown 8/16/19 1432


 


Urine Opiates Screen Negative 8/16/19 1358


 


Acetaminophen Level < 10 ug/ml 8/16/19 1432


 


Urine Barbiturates Screen Negative 8/16/19 1358


 


Ur Tricyclic Antidepressants Screen Negative 8/16/19 1358


 


Urine Phencyclidine Screen Negative 8/16/19 1358


 


Urine Amphetamines Screen Negative 8/16/19 1358


 


Urine Benzodiazepines Screen Negative 8/16/19 1358


 


Lithium Level 1.3 mmol/L H 8/17/19 0549


 


Urine Cocaine Screen Negative 8/16/19 1358


 


Urine Cannabinoids Screen Negative 8/16/19 1358


 


Serum Alcohol < 10 mg/dl 8/16/19 1432








Condition:  Improved


Discharge to:  Home





Discharge Instructions


Home Meds


Reported Medications


Amoxicillin/Pot Clav 875-125 Mg Tab (AUGMENTIN 875-125 TABLET) 1 Each Tablet, 1 


TAB PO BID for 10 Days, TAB


   8/19/19


Sertraline Hcl (ZOLOFT) 100 Mg Tablet, 2 TAB PO QDAY for 30 Days, TAB 1 Refill


   8/19/19


Gabapentin (GABAPENTIN) 300 Mg Capsule, 300 MG PO BID, CAPSULE


   8/19/19


Eluxadoline (Viberzi) 100 Mg Tablet, 1 TAB PO BID


   8/16/19


Topiramate (TOPAMAX) 200 Mg Tablet, 200 MG PO BID


   8/16/19


Lithium Carbonate (LITHIUM CARBONATE) 450 Mg Tabcr, 450 MG PO BID


   8/16/19


Lurasidone (LATUDA) 80 Mg Tab, 80 MG PO QHS, TAB


   7/25/19


Multivits,Th W-Fe,Other Min (THERA-M) 1 Each Tablet, 1 EACH PO QDAY


   6/20/19


Trazodone Hcl (TRAZODONE HCL) 100 Mg Tablet, 200 MG PO QHS PRN for INSOMNIA, TAB


   TAKE 200 MG ABOUT AN HOUR BEFORE YOU PLAN TO GO TO SLEEP AS NEEDED


   FOR INSOMNIA.


   6/18/19


Rivaroxaban 20 Mg (XARELTO 20 MG) 20 Mg Tablet, 20 MG PO QDAY, TAB


   6/18/19


Discontinued Reported Medications


Venlafaxine Hcl (EFFEXOR XR) 75 Mg Cap.er.24h, 75 MG PO QDAY


   8/16/19


Sertraline Hcl (SERTRALINE HCL) 100 Mg Tablet, 200 TAB PO QDAY, TAB


   6/20/19


Topiramate (TOPIRAMATE) 50 Mg Tablet, 50 MG PO QAM


   6/20/19


Lithium Carbonate (LITHIUM CARBONATE) 300 Mg Tablet, 300 MG PO TID


   6/18/19


Multpiple Antipsychotics Used:  No


Diet:  Regular


Activity:  As Tolerated


Special Instructions:  


Discharge home.  Follow-up with outpatient therapy and medication 


management.  Follow-up with Primary Care Provider for medical concerns. 


Abstain from alcohol and all illicit substances.  Call crisis line or 


return to Emergency Room for any suicidal or homicidal thoughts.











ARINAA GRESHAM MD                 Aug 19, 2019 14:11

## 2023-12-07 DIAGNOSIS — G40.909 SEIZURE DISORDER: Primary | ICD-10-CM

## 2024-01-18 ENCOUNTER — OFFICE VISIT (OUTPATIENT)
Dept: NEUROLOGY | Facility: CLINIC | Age: 49
End: 2024-01-18
Payer: MEDICARE

## 2024-01-18 VITALS
HEART RATE: 78 BPM | HEIGHT: 69 IN | DIASTOLIC BLOOD PRESSURE: 82 MMHG | WEIGHT: 275 LBS | BODY MASS INDEX: 40.73 KG/M2 | OXYGEN SATURATION: 98 % | RESPIRATION RATE: 18 BRPM | SYSTOLIC BLOOD PRESSURE: 118 MMHG

## 2024-01-18 DIAGNOSIS — G62.9 PERIPHERAL POLYNEUROPATHY: ICD-10-CM

## 2024-01-18 DIAGNOSIS — G40.909 SEIZURE DISORDER: ICD-10-CM

## 2024-01-18 DIAGNOSIS — M54.9 DORSALGIA, UNSPECIFIED: ICD-10-CM

## 2024-01-18 DIAGNOSIS — M54.16 LUMBAR RADICULOPATHY, CHRONIC: ICD-10-CM

## 2024-01-18 DIAGNOSIS — G37.9 DEMYELINATING DISEASE OF CENTRAL NERVOUS SYSTEM, UNSPECIFIED: Primary | ICD-10-CM

## 2024-01-18 DIAGNOSIS — M54.16 LUMBAR RADICULOPATHY: ICD-10-CM

## 2024-01-18 PROCEDURE — 99204 OFFICE O/P NEW MOD 45 MIN: CPT | Mod: PBBFAC | Performed by: PSYCHIATRY & NEUROLOGY

## 2024-01-18 PROCEDURE — 99204 OFFICE O/P NEW MOD 45 MIN: CPT | Mod: S$PBB,,, | Performed by: PSYCHIATRY & NEUROLOGY

## 2024-01-18 NOTE — PATIENT INSTRUCTIONS
LP/spinal tap to r/o GUILLAIN- BARRE SYNDROME   MRI L spine w/o contrast   Needs Psych eval for bipolar - rec Freddie   Cont Gabapentin   F/u 6 weeks
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
## 2024-01-30 ENCOUNTER — HOSPITAL ENCOUNTER (OUTPATIENT)
Dept: RADIOLOGY | Facility: HOSPITAL | Age: 49
Discharge: HOME | End: 2024-01-30
Attending: ANESTHESIOLOGY
Payer: MEDICARE

## 2024-01-30 DIAGNOSIS — M54.6 PAIN IN THORACIC SPINE: ICD-10-CM

## 2024-01-30 DIAGNOSIS — M54.2 CERVICAL PAIN: ICD-10-CM

## 2024-01-30 DIAGNOSIS — M54.50 LOW BACK PAIN, UNSPECIFIED BACK PAIN LATERALITY, UNSPECIFIED CHRONICITY, UNSPECIFIED WHETHER SCIATICA PRESENT: ICD-10-CM

## 2024-01-30 PROCEDURE — 72040 X-RAY EXAM NECK SPINE 2-3 VW: CPT | Mod: 26,,, | Performed by: RADIOLOGY

## 2024-01-30 PROCEDURE — 72100 X-RAY EXAM L-S SPINE 2/3 VWS: CPT | Mod: TC

## 2024-01-30 PROCEDURE — 72070 X-RAY EXAM THORAC SPINE 2VWS: CPT | Mod: TC

## 2024-01-30 PROCEDURE — 72100 X-RAY EXAM L-S SPINE 2/3 VWS: CPT | Mod: 26,,, | Performed by: RADIOLOGY

## 2024-01-30 PROCEDURE — 72070 X-RAY EXAM THORAC SPINE 2VWS: CPT | Mod: 26,,, | Performed by: RADIOLOGY

## 2024-01-30 PROCEDURE — 72040 X-RAY EXAM NECK SPINE 2-3 VW: CPT | Mod: TC

## 2024-01-31 ENCOUNTER — HOSPITAL ENCOUNTER (OUTPATIENT)
Dept: RADIOLOGY | Facility: HOSPITAL | Age: 49
Discharge: HOME | End: 2024-01-31
Attending: PSYCHIATRY & NEUROLOGY
Payer: MEDICARE

## 2024-01-31 DIAGNOSIS — G35 MULTIPLE SCLEROSIS: Primary | ICD-10-CM

## 2024-01-31 DIAGNOSIS — G37.9 DEMYELINATING DISEASE OF CENTRAL NERVOUS SYSTEM, UNSPECIFIED: ICD-10-CM

## 2024-01-31 DIAGNOSIS — G37.9 DEMYELINATING DISEASE OF CENTRAL NERVOUS SYSTEM, UNSPECIFIED: Primary | ICD-10-CM

## 2024-01-31 PROCEDURE — 70450 CT HEAD/BRAIN W/O DYE: CPT | Mod: 26,,, | Performed by: RADIOLOGY

## 2024-01-31 PROCEDURE — 70450 CT HEAD/BRAIN W/O DYE: CPT | Mod: TC

## 2024-02-05 ENCOUNTER — HOSPITAL ENCOUNTER (OUTPATIENT)
Dept: RADIOLOGY | Facility: HOSPITAL | Age: 49
Discharge: HOME | End: 2024-02-05
Attending: PSYCHIATRY & NEUROLOGY
Payer: MEDICARE

## 2024-02-05 VITALS
OXYGEN SATURATION: 97 % | BODY MASS INDEX: 39.99 KG/M2 | WEIGHT: 270 LBS | HEART RATE: 73 BPM | SYSTOLIC BLOOD PRESSURE: 143 MMHG | HEIGHT: 69 IN | DIASTOLIC BLOOD PRESSURE: 85 MMHG | RESPIRATION RATE: 18 BRPM | TEMPERATURE: 99 F

## 2024-02-05 DIAGNOSIS — G37.9 DEMYELINATING DISEASE OF CENTRAL NERVOUS SYSTEM, UNSPECIFIED: Primary | ICD-10-CM

## 2024-02-05 DIAGNOSIS — G37.9 DEMYELINATING DISEASE OF CENTRAL NERVOUS SYSTEM, UNSPECIFIED: ICD-10-CM

## 2024-02-05 LAB
APTT PPP: 23.8 SECONDS (ref 25.2–37.3)
BASOPHILS # BLD AUTO: 0.04 K/UL (ref 0–0.2)
BASOPHILS NFR BLD AUTO: 0.4 % (ref 0–1)
CLARITY CSF: CLEAR
COLOR CSF: COLORLESS
DIFFERENTIAL METHOD BLD: ABNORMAL
EOSINOPHIL # BLD AUTO: 0.11 K/UL (ref 0–0.5)
EOSINOPHIL NFR BLD AUTO: 1 % (ref 1–4)
ERYTHROCYTE [DISTWIDTH] IN BLOOD BY AUTOMATED COUNT: 13.4 % (ref 11.5–14.5)
GLUCOSE CSF-MCNC: 70 MG/DL (ref 40–70)
GRANULOCYTES NFR CSF MANUAL: 17 % (ref 0–25)
HCT VFR BLD AUTO: 44.9 % (ref 38–47)
HGB BLD-MCNC: 15.2 G/DL (ref 12–16)
IMM GRANULOCYTES # BLD AUTO: 0.04 K/UL (ref 0–0.04)
IMM GRANULOCYTES NFR BLD: 0.4 % (ref 0–0.4)
INR BLD: 0.94
LYMPHOCYTES # BLD AUTO: 4.71 K/UL (ref 1–4.8)
LYMPHOCYTES NFR BLD AUTO: 44 % (ref 27–41)
LYMPHOCYTES NFR CSF MANUAL: 83 % (ref 40–80)
MCH RBC QN AUTO: 30.5 PG (ref 27–31)
MCHC RBC AUTO-ENTMCNC: 33.9 G/DL (ref 32–36)
MCV RBC AUTO: 90 FL (ref 80–96)
MONOCYTES # BLD AUTO: 0.47 K/UL (ref 0–0.8)
MONOCYTES NFR BLD AUTO: 4.4 % (ref 2–6)
MPC BLD CALC-MCNC: 10 FL (ref 9.4–12.4)
NEUTROPHILS # BLD AUTO: 5.34 K/UL (ref 1.8–7.7)
NEUTROPHILS NFR BLD AUTO: 49.8 % (ref 53–65)
NRBC # BLD AUTO: 0 X10E3/UL
NRBC, AUTO (.00): 0 %
PLATELET # BLD AUTO: 232 K/UL (ref 150–400)
PROT CSF-MCNC: 45 MG/DL (ref 15–45)
PROTHROMBIN TIME: 12.5 SECONDS (ref 11.7–14.7)
RBC # BLD AUTO: 4.99 M/UL (ref 4.2–5.4)
RBC # CSF MANUAL: 300 /CUMM
TUBE # CSF: 3
WBC # BLD AUTO: 10.71 K/UL (ref 4.5–11)
WBC # CSF MANUAL: 2 /CUMM (ref 0–5)
XANTHOCHROMIA CSF QL: NEGATIVE

## 2024-02-05 PROCEDURE — 82945 GLUCOSE OTHER FLUID: CPT | Performed by: PSYCHIATRY & NEUROLOGY

## 2024-02-05 PROCEDURE — 85610 PROTHROMBIN TIME: CPT | Performed by: STUDENT IN AN ORGANIZED HEALTH CARE EDUCATION/TRAINING PROGRAM

## 2024-02-05 PROCEDURE — 27201369 FL LUMBAR PUNCTURE DIAGNOSTIC WITH IMAGING

## 2024-02-05 PROCEDURE — 25000003 PHARM REV CODE 250: Performed by: STUDENT IN AN ORGANIZED HEALTH CARE EDUCATION/TRAINING PROGRAM

## 2024-02-05 PROCEDURE — 82040 ASSAY OF SERUM ALBUMIN: CPT | Performed by: PSYCHIATRY & NEUROLOGY

## 2024-02-05 PROCEDURE — 62328 DX LMBR SPI PNXR W/FLUOR/CT: CPT

## 2024-02-05 PROCEDURE — 86592 SYPHILIS TEST NON-TREP QUAL: CPT | Mod: 90 | Performed by: PSYCHIATRY & NEUROLOGY

## 2024-02-05 PROCEDURE — 89051 BODY FLUID CELL COUNT: CPT | Performed by: PSYCHIATRY & NEUROLOGY

## 2024-02-05 PROCEDURE — 84157 ASSAY OF PROTEIN OTHER: CPT | Performed by: PSYCHIATRY & NEUROLOGY

## 2024-02-05 PROCEDURE — 85025 COMPLETE CBC W/AUTO DIFF WBC: CPT | Performed by: STUDENT IN AN ORGANIZED HEALTH CARE EDUCATION/TRAINING PROGRAM

## 2024-02-05 RX ORDER — METHOCARBAMOL 500 MG/1
500 TABLET, FILM COATED ORAL 2 TIMES DAILY
COMMUNITY

## 2024-02-05 RX ORDER — ONDANSETRON 4 MG/1
8 TABLET, ORALLY DISINTEGRATING ORAL ONCE
Status: COMPLETED | OUTPATIENT
Start: 2024-02-05 | End: 2024-02-05

## 2024-02-05 RX ORDER — AMOXICILLIN 500 MG/1
500 CAPSULE ORAL
COMMUNITY

## 2024-02-05 RX ORDER — CARBAMAZEPINE 200 MG/1
200 TABLET, EXTENDED RELEASE ORAL 2 TIMES DAILY
COMMUNITY

## 2024-02-05 RX ORDER — ONDANSETRON HYDROCHLORIDE 2 MG/ML
4 INJECTION, SOLUTION INTRAVENOUS ONCE
Status: DISCONTINUED | OUTPATIENT
Start: 2024-02-05 | End: 2024-02-05

## 2024-02-05 RX ORDER — CELECOXIB 200 MG/1
200 CAPSULE ORAL
COMMUNITY

## 2024-02-05 RX ORDER — PANTOPRAZOLE SODIUM 40 MG/1
40 TABLET, DELAYED RELEASE ORAL DAILY
COMMUNITY

## 2024-02-05 RX ORDER — LURASIDONE HYDROCHLORIDE 80 MG/1
80 TABLET, FILM COATED ORAL DAILY
COMMUNITY

## 2024-02-05 RX ORDER — GABAPENTIN 600 MG/1
600 TABLET ORAL 3 TIMES DAILY
COMMUNITY
End: 2024-02-07

## 2024-02-05 RX ORDER — TOPIRAMATE 100 MG/1
200 TABLET, FILM COATED ORAL DAILY
COMMUNITY

## 2024-02-05 RX ORDER — FOLIC ACID 1 MG/1
1 TABLET ORAL DAILY
COMMUNITY

## 2024-02-05 RX ORDER — QUETIAPINE FUMARATE 100 MG/1
TABLET, FILM COATED ORAL DAILY
COMMUNITY
End: 2024-02-07

## 2024-02-05 RX ORDER — POTASSIUM CHLORIDE 750 MG/1
10 CAPSULE, EXTENDED RELEASE ORAL ONCE
COMMUNITY

## 2024-02-05 RX ORDER — ROSUVASTATIN CALCIUM 10 MG/1
10 TABLET, COATED ORAL DAILY
COMMUNITY

## 2024-02-05 RX ADMIN — ONDANSETRON 8 MG: 4 TABLET, ORALLY DISINTEGRATING ORAL at 01:02

## 2024-02-05 NOTE — DISCHARGE INSTRUCTIONS
Rest today and tomorrow.  Remove band aid in 24 hrs then may shower.  Keep follow up appointment with Dr Joseph

## 2024-02-05 NOTE — PROGRESS NOTES
Lumbar puncture  Performed by A [facility]  Procedure was explained to the patient including risks and possible complications.  Patient agreed to procedure consent is signed.  A formal timeout was called all staff present agreed to patient and procedure.  The lower back was prepped with Betadine and sterile field was established.  1% lidocaine was used as local anesthetic.  Under fluoroscopic guidance a 22 gauge spinal needle was placed at the L3-L4 interspace.  Upon good return of CSF an opening pressure of 19 cm H2O was recorded.  7 cc clear cerebrospinal fluid was removed.  The needle was removed and the puncture site was cleaned and bandaged.  The patient tolerated the procedure well there were no immediate postprocedure complications.  Total fluoroscopy time was 1 minutes 13 seconds.

## 2024-02-07 ENCOUNTER — OFFICE VISIT (OUTPATIENT)
Dept: NEUROLOGY | Facility: CLINIC | Age: 49
End: 2024-02-07
Payer: MEDICARE

## 2024-02-07 VITALS
HEIGHT: 69 IN | DIASTOLIC BLOOD PRESSURE: 84 MMHG | WEIGHT: 270 LBS | RESPIRATION RATE: 18 BRPM | HEART RATE: 93 BPM | OXYGEN SATURATION: 98 % | BODY MASS INDEX: 39.99 KG/M2 | SYSTOLIC BLOOD PRESSURE: 132 MMHG

## 2024-02-07 DIAGNOSIS — F31.9 BIPOLAR 1 DISORDER: ICD-10-CM

## 2024-02-07 DIAGNOSIS — G37.9 DEMYELINATING DISEASE OF CENTRAL NERVOUS SYSTEM, UNSPECIFIED: Primary | ICD-10-CM

## 2024-02-07 DIAGNOSIS — R20.2 PARESTHESIAS: ICD-10-CM

## 2024-02-07 DIAGNOSIS — G40.909 SEIZURE DISORDER: ICD-10-CM

## 2024-02-07 LAB — VDRL CSF QL: NEGATIVE

## 2024-02-07 PROCEDURE — 99215 OFFICE O/P EST HI 40 MIN: CPT | Mod: PBBFAC | Performed by: PSYCHIATRY & NEUROLOGY

## 2024-02-07 PROCEDURE — 99214 OFFICE O/P EST MOD 30 MIN: CPT | Mod: S$PBB,,, | Performed by: PSYCHIATRY & NEUROLOGY

## 2024-02-07 RX ORDER — PREGABALIN 75 MG/1
75 CAPSULE ORAL 2 TIMES DAILY
Qty: 180 CAPSULE | Refills: 3 | Status: SHIPPED | OUTPATIENT
Start: 2024-02-07

## 2024-02-07 RX ORDER — TRIAMCINOLONE ACETONIDE 1 MG/G
PASTE DENTAL
COMMUNITY

## 2024-02-07 NOTE — PROGRESS NOTES
Subjective:       Patient ID: Traci Aguila is a 48 y.o. female     Chief Complaint:    Chief Complaint   Patient presents with    demyelinating disease     Pt. States a lot of pain in feet.        Allergies:  Dilaudid [hydromorphone], Lamictal [lamotrigine], and Tetracyclines    Current Medications:    Outpatient Encounter Medications as of 2/7/2024   Medication Sig Dispense Refill    amoxicillin (AMOXIL) 500 MG capsule Take 500 mg by mouth 4 (four) times daily with meals and nightly.      apixaban (ELIQUIS) 5 mg Tab Take 5 mg by mouth 2 (two) times daily.      carBAMazepine (TEGRETOL XR) 200 MG 12 hr tablet Take 200 mg by mouth 2 (two) times daily.      celecoxib (CELEBREX) 200 MG capsule Take 200 mg by mouth.      folic acid (FOLVITE) 1 MG tablet Take 1 mg by mouth once daily.      gabapentin (NEURONTIN) 600 MG tablet Take 600 mg by mouth 3 (three) times daily.      lurasidone (LATUDA) 80 mg Tab tablet Take 80 mg by mouth once daily.      methocarbamoL (ROBAXIN) 500 MG Tab Take 500 mg by mouth 2 (two) times a day.      pantoprazole (PROTONIX) 40 MG tablet Take 40 mg by mouth once daily.      potassium chloride (MICRO-K) 10 MEQ CpSR Take 10 mEq by mouth once.      rosuvastatin (CRESTOR) 10 MG tablet Take 10 mg by mouth once daily.      topiramate (TOPAMAX) 100 MG tablet Take 200 mg by mouth once daily.      triamcinolone acetonide 0.1% (KENALOG) 0.1 % paste       [DISCONTINUED] QUEtiapine (SEROQUEL) 100 MG Tab Take by mouth once daily.       No facility-administered encounter medications on file as of 2/7/2024.       History of Present Illness  48-year-old white female with an extensive history including bipolar depression and anxiety in clinic today for follow-up of MRI brain lumbar spine and lumbar puncture results.  Patient is slightly premature for follow-up as I do not have the MS panel from cerebrospinal fluid results and only a CT head which showed very few nonspecific foci of white matter  "consistent with mild microvascular ischemic changes but NO acute intracranial pathology, no hemorrhage and no infarct/strokes.  MRI lumbar spine which showed only facet degeneration and no spinal canal stenosis.  She had no albumin or cytologic dissociation ratio which would be seen in Guillain-[place] syndrome which she claims she had previous diagnosis for in [place] years ago?  Although she was never treated for such?  Multiple sclerosis panel still pending from cerebrospinal fluid results which were otherwise normal.  Pain mgmt just took some xrays of spine now pending ? She reports they are starting her on pain meds soon   Wilian 600 mg bid-tid not helping her paresthesias in feet /hands   She is desiring switch to Lyrica 75 mg bid             Past Medical History:   Diagnosis Date    Anticoagulant long-term use     Arthritis     Asthma     Digestive disorder     PONV (postoperative nausea and vomiting)     Seizures        Past Surgical History:   Procedure Laterality Date    GALLBLADDER SURGERY      HYSTERECTOMY      ROTATOR CUFF REPAIR Bilateral     TONSILLECTOMY      WISDOM TOOTH EXTRACTION Bilateral        Social History  Ms. Aguila  reports that she has quit smoking. Her smoking use included cigarettes. She has never used smokeless tobacco. She reports that she does not drink alcohol and does not use drugs.    Family History  Ms.'s Aguila family history is not on file.    Review of Systems  Review of Systems   Musculoskeletal:  Positive for back pain, joint pain and neck pain.   Psychiatric/Behavioral:  Positive for depression. The patient is nervous/anxious.    All other systems reviewed and are negative.     Objective:   /84 (BP Location: Right arm, Patient Position: Sitting, BP Method: Large (Manual))   Pulse 93   Resp 18   Ht 5' 9" (1.753 m)   Wt 122.5 kg (270 lb)   SpO2 98%   BMI 39.87 kg/m²    NEUROLOGICAL EXAMINATION:     MENTAL STATUS   Oriented to person, place, and time.   Level of " consciousness: alert  Knowledge: consistent with education.        Bipolar depression and anxiety     CRANIAL NERVES   Cranial nerves II through XII intact.     MOTOR EXAM     Strength   Strength 5/5 throughout.     GAIT AND COORDINATION     Gait  Gait: spastic       Physical Exam  Vitals reviewed.   Constitutional:       Appearance: She is obese.   Neurological:      General: No focal deficit present.      Mental Status: She is alert and oriented to person, place, and time. Mental status is at baseline.      Cranial Nerves: Cranial nerves 2-12 are intact.      Motor: Motor strength is normal.         Assessment:     Seizure disorder    Bipolar 1 disorder    Paresthesias         Primary Diagnosis and ICD10  Seizure disorder [G40.909]    Plan:     Patient Instructions   Continue neuropathic pain medication for paresthesias- taper off Josse and start Lyrica 75 mg twice daily   Needs psych evaluation for bipolar depression/anxiety/childhood issues- Freddie   Maintain healthy lifestyle habits with proper sleep hygiene  Increase physical activity as tolerated  Follow-up 3 months        Medications Discontinued During This Encounter   Medication Reason    QUEtiapine (SEROQUEL) 100 MG Tab        Requested Prescriptions      No prescriptions requested or ordered in this encounter

## 2024-02-07 NOTE — PATIENT INSTRUCTIONS
Continue neuropathic pain medication for paresthesias- taper off Josse and start Lyrica 75 mg twice daily   Needs psych evaluation for bipolar depression/anxiety/childhood issues- Freddie   Maintain healthy lifestyle habits with proper sleep hygiene  Increase physical activity as tolerated  Cont home PT/Rehab   Follow-up 3 months

## 2024-02-09 LAB
ALBUMIN CSF-MCNC: 23.5 MG/DL (ref 0–35)
ALBUMIN INDEX: 6
ALBUMIN SERPL BCP-MCNC: 3.9 G/DL (ref 3.5–5)
APPEARANCE CSF: NORMAL
IGG CSF-MCNC: 2.21 MG/DL (ref 0–3.4)
IGG INDEX: 0.4
IGG SERPL-MCNC: 864 MG/DL (ref 767–1590)
IGG SYNTHESIS RATE: 0 (ref 0–12)
OLIGOCLONAL BANDS CSF QL: NORMAL
PATH INTERP CSF-IMP: NORMAL
PROT CSF-MCNC: 45 MG/DL (ref 15–45)

## 2025-04-26 ENCOUNTER — OFFICE VISIT (OUTPATIENT)
Dept: FAMILY MEDICINE | Facility: CLINIC | Age: 50
End: 2025-04-26
Payer: MEDICARE

## 2025-04-26 VITALS
HEART RATE: 78 BPM | OXYGEN SATURATION: 97 % | BODY MASS INDEX: 46.96 KG/M2 | WEIGHT: 293 LBS | DIASTOLIC BLOOD PRESSURE: 76 MMHG | SYSTOLIC BLOOD PRESSURE: 110 MMHG | TEMPERATURE: 99 F

## 2025-04-26 DIAGNOSIS — J40 BRONCHITIS: Primary | ICD-10-CM

## 2025-04-26 DIAGNOSIS — Z20.828 EXPOSURE TO VIRAL DISEASE: ICD-10-CM

## 2025-04-26 LAB
CTP QC/QA: YES
CTP QC/QA: YES
POC MOLECULAR INFLUENZA A AGN: NEGATIVE
POC MOLECULAR INFLUENZA B AGN: NEGATIVE
SARS-COV-2 RDRP RESP QL NAA+PROBE: NEGATIVE

## 2025-04-26 PROCEDURE — 1159F MED LIST DOCD IN RCRD: CPT | Mod: ,,, | Performed by: FAMILY MEDICINE

## 2025-04-26 PROCEDURE — 3078F DIAST BP <80 MM HG: CPT | Mod: ,,, | Performed by: FAMILY MEDICINE

## 2025-04-26 PROCEDURE — 3074F SYST BP LT 130 MM HG: CPT | Mod: ,,, | Performed by: FAMILY MEDICINE

## 2025-04-26 PROCEDURE — 87502 INFLUENZA DNA AMP PROBE: CPT | Mod: RHCUB | Performed by: FAMILY MEDICINE

## 2025-04-26 PROCEDURE — 99204 OFFICE O/P NEW MOD 45 MIN: CPT | Mod: 25,,, | Performed by: FAMILY MEDICINE

## 2025-04-26 PROCEDURE — 3008F BODY MASS INDEX DOCD: CPT | Mod: ,,, | Performed by: FAMILY MEDICINE

## 2025-04-26 PROCEDURE — 4010F ACE/ARB THERAPY RXD/TAKEN: CPT | Mod: ,,, | Performed by: FAMILY MEDICINE

## 2025-04-26 PROCEDURE — 1160F RVW MEDS BY RX/DR IN RCRD: CPT | Mod: ,,, | Performed by: FAMILY MEDICINE

## 2025-04-26 PROCEDURE — 87635 SARS-COV-2 COVID-19 AMP PRB: CPT | Mod: RHCUB | Performed by: FAMILY MEDICINE

## 2025-04-26 PROCEDURE — 96372 THER/PROPH/DIAG INJ SC/IM: CPT | Mod: ,,, | Performed by: FAMILY MEDICINE

## 2025-04-26 RX ORDER — SULFAMETHOXAZOLE AND TRIMETHOPRIM 800; 160 MG/1; MG/1
1 TABLET ORAL
COMMUNITY
Start: 2025-04-17 | End: 2025-04-27

## 2025-04-26 RX ORDER — AZITHROMYCIN 250 MG/1
TABLET, FILM COATED ORAL
Qty: 6 TABLET | Refills: 0 | Status: SHIPPED | OUTPATIENT
Start: 2025-04-26

## 2025-04-26 RX ORDER — ASPIRIN 325 MG
50000 TABLET, DELAYED RELEASE (ENTERIC COATED) ORAL
COMMUNITY
Start: 2024-10-17 | End: 2025-10-17

## 2025-04-26 RX ORDER — PREDNISONE 20 MG/1
20 TABLET ORAL DAILY
Qty: 5 TABLET | Refills: 0 | Status: SHIPPED | OUTPATIENT
Start: 2025-04-26 | End: 2025-05-01

## 2025-04-26 RX ORDER — BACLOFEN 5 MG/1
TABLET ORAL
COMMUNITY
Start: 2024-03-12

## 2025-04-26 RX ORDER — NORTRIPTYLINE HYDROCHLORIDE 75 MG/1
150 CAPSULE ORAL NIGHTLY
COMMUNITY

## 2025-04-26 RX ORDER — DEXAMETHASONE SODIUM PHOSPHATE 4 MG/ML
4 INJECTION, SOLUTION INTRA-ARTICULAR; INTRALESIONAL; INTRAMUSCULAR; INTRAVENOUS; SOFT TISSUE
Status: COMPLETED | OUTPATIENT
Start: 2025-04-26 | End: 2025-04-26

## 2025-04-26 RX ORDER — CETIRIZINE HYDROCHLORIDE 10 MG/1
10 TABLET ORAL NIGHTLY
COMMUNITY

## 2025-04-26 RX ORDER — SERTRALINE HYDROCHLORIDE 100 MG/1
TABLET, FILM COATED ORAL
COMMUNITY

## 2025-04-26 RX ORDER — LINCOMYCIN HYDROCHLORIDE 300 MG/ML
300 INJECTION, SOLUTION INTRAMUSCULAR; INTRAVENOUS; SUBCONJUNCTIVAL
Status: COMPLETED | OUTPATIENT
Start: 2025-04-26 | End: 2025-04-26

## 2025-04-26 RX ORDER — CYANOCOBALAMIN 1000 UG/ML
1000 INJECTION, SOLUTION INTRAMUSCULAR; SUBCUTANEOUS
COMMUNITY
Start: 2025-02-24

## 2025-04-26 RX ORDER — PROMETHAZINE HYDROCHLORIDE 25 MG/1
25 TABLET ORAL
COMMUNITY
Start: 2024-12-04

## 2025-04-26 RX ORDER — PROMETHAZINE HYDROCHLORIDE AND DEXTROMETHORPHAN HYDROBROMIDE 6.25; 15 MG/5ML; MG/5ML
5 SYRUP ORAL EVERY 4 HOURS PRN
Qty: 118 ML | Refills: 0 | Status: SHIPPED | OUTPATIENT
Start: 2025-04-26

## 2025-04-26 RX ORDER — BUPROPION HYDROCHLORIDE 150 MG/1
150 TABLET ORAL EVERY MORNING
COMMUNITY

## 2025-04-26 RX ORDER — FLUTICASONE PROPIONATE 50 MCG
1 SPRAY, SUSPENSION (ML) NASAL DAILY
COMMUNITY
Start: 2025-03-14

## 2025-04-26 RX ORDER — LOSARTAN POTASSIUM 50 MG/1
50 TABLET ORAL
COMMUNITY

## 2025-04-26 RX ORDER — BUTALBITAL, ACETAMINOPHEN AND CAFFEINE 50; 325; 40 MG/1; MG/1; MG/1
1 TABLET ORAL EVERY 4 HOURS PRN
COMMUNITY
Start: 2024-12-19

## 2025-04-26 RX ORDER — GABAPENTIN 600 MG/1
TABLET ORAL
COMMUNITY
Start: 2024-02-29

## 2025-04-26 RX ORDER — PHENTERMINE HYDROCHLORIDE 37.5 MG/1
37.5 TABLET ORAL
COMMUNITY
Start: 2025-04-09 | End: 2025-07-08

## 2025-04-26 RX ORDER — ALBUTEROL SULFATE 90 UG/1
INHALANT RESPIRATORY (INHALATION)
COMMUNITY

## 2025-04-26 RX ORDER — COLESTIPOL HYDROCHLORIDE 1 G/1
TABLET ORAL
COMMUNITY

## 2025-04-26 RX ADMIN — LINCOMYCIN HYDROCHLORIDE 300 MG: 300 INJECTION, SOLUTION INTRAMUSCULAR; INTRAVENOUS; SUBCONJUNCTIVAL at 02:04

## 2025-04-26 RX ADMIN — DEXAMETHASONE SODIUM PHOSPHATE 4 MG: 4 INJECTION, SOLUTION INTRA-ARTICULAR; INTRALESIONAL; INTRAMUSCULAR; INTRAVENOUS; SOFT TISSUE at 02:04

## 2025-04-26 NOTE — PROGRESS NOTES
Subjective:       Patient ID: Traci Aguila is a 49 y.o. female.    Chief Complaint: Cough (Symptoms for 2-3 days) and Nasal Congestion    Cough  Associated symptoms include postnasal drip and rhinorrhea. Pertinent negatives include no chest pain, chills, ear pain, fever, headaches, myalgias, rash, sore throat, shortness of breath or wheezing. There is no history of environmental allergies.     Review of Systems   Constitutional:  Negative for activity change, appetite change, chills, diaphoresis, fatigue, fever and unexpected weight change.   HENT:  Positive for nasal congestion, postnasal drip, rhinorrhea and sinus pressure/congestion. Negative for dental problem, drooling, ear discharge, ear pain, facial swelling, hearing loss, mouth sores, nosebleeds, sneezing, sore throat, tinnitus, trouble swallowing, voice change and goiter.    Eyes:  Negative for photophobia, discharge, itching and visual disturbance.   Respiratory:  Positive for cough. Negative for apnea, choking, chest tightness, shortness of breath, wheezing and stridor.    Cardiovascular:  Negative for chest pain, palpitations, leg swelling and claudication.   Gastrointestinal:  Negative for abdominal distention, abdominal pain, anal bleeding, blood in stool, change in bowel habit, constipation, diarrhea, nausea and vomiting.   Endocrine: Negative for cold intolerance, heat intolerance, polydipsia, polyphagia and polyuria.   Genitourinary:  Negative for bladder incontinence, decreased urine volume, difficulty urinating, dysuria, enuresis, flank pain, frequency, hematuria, nocturia, pelvic pain and urgency.   Musculoskeletal:  Negative for arthralgias, back pain, gait problem, joint swelling, leg pain, myalgias, neck pain, neck stiffness and joint deformity.   Integumentary:  Negative for pallor, rash, wound, breast mass and breast tenderness.   Allergic/Immunologic: Negative for environmental allergies, food allergies and immunocompromised state.    Neurological:  Negative for dizziness, vertigo, tremors, seizures, syncope, facial asymmetry, speech difficulty, weakness, light-headedness, numbness, headaches, coordination difficulties and memory loss.   Hematological:  Negative for adenopathy. Does not bruise/bleed easily.   Psychiatric/Behavioral:  Negative for agitation, behavioral problems, confusion, decreased concentration, dysphoric mood, hallucinations, self-injury, sleep disturbance and suicidal ideas. The patient is not nervous/anxious and is not hyperactive.    Breast: Negative for mass and tenderness        Objective:      Physical Exam  Vitals reviewed.   Constitutional:       Appearance: Normal appearance.   HENT:      Head: Normocephalic and atraumatic.      Right Ear: Tympanic membrane, ear canal and external ear normal.      Left Ear: Tympanic membrane, ear canal and external ear normal.      Nose: Congestion and rhinorrhea present.      Mouth/Throat:      Mouth: Mucous membranes are moist.      Pharynx: Oropharynx is clear. Posterior oropharyngeal erythema present.   Eyes:      Extraocular Movements: Extraocular movements intact.      Conjunctiva/sclera: Conjunctivae normal.      Pupils: Pupils are equal, round, and reactive to light.   Cardiovascular:      Rate and Rhythm: Normal rate and regular rhythm.      Pulses: Normal pulses.      Heart sounds: Normal heart sounds.   Pulmonary:      Effort: Pulmonary effort is normal.      Breath sounds: Normal breath sounds.   Abdominal:      General: Bowel sounds are normal.      Palpations: Abdomen is soft.   Musculoskeletal:         General: Normal range of motion.      Cervical back: Normal range of motion and neck supple.   Skin:     General: Skin is warm and dry.   Neurological:      General: No focal deficit present.      Mental Status: She is alert. Mental status is at baseline.   Psychiatric:         Mood and Affect: Mood normal.         Behavior: Behavior normal.         Thought Content:  Thought content normal.         Judgment: Judgment normal.         Assessment:       1. Bronchitis    2. Exposure to viral disease        Plan:     Bronchitis  -     dexAMETHasone injection 4 mg  -     lincomycin injection 300 mg  -     predniSONE (DELTASONE) 20 MG tablet; Take 1 tablet (20 mg total) by mouth once daily. for 5 days  Dispense: 5 tablet; Refill: 0  -     promethazine-dextromethorphan (PROMETHAZINE-DM) 6.25-15 mg/5 mL Syrp; Take 5 mLs by mouth every 4 (four) hours as needed.  Dispense: 118 mL; Refill: 0  -     azithromycin (Z-ANTHONY) 250 MG tablet; Take 2 tablets by mouth on day 1; Take 1 tablet by mouth on days 2-5  Dispense: 6 tablet; Refill: 0    Exposure to viral disease  -     POCT COVID-19 Rapid Screening  -     POCT Influenza A/B Molecular